# Patient Record
Sex: FEMALE | Race: WHITE | NOT HISPANIC OR LATINO | Employment: FULL TIME | URBAN - METROPOLITAN AREA
[De-identification: names, ages, dates, MRNs, and addresses within clinical notes are randomized per-mention and may not be internally consistent; named-entity substitution may affect disease eponyms.]

---

## 2020-09-22 ENCOUNTER — HOSPITAL ENCOUNTER (EMERGENCY)
Facility: HOSPITAL | Age: 45
Discharge: HOME | End: 2020-09-23
Attending: EMERGENCY MEDICINE | Admitting: EMERGENCY MEDICINE

## 2020-09-22 ENCOUNTER — APPOINTMENT (OUTPATIENT)
Dept: GENERAL RADIOLOGY | Facility: HOSPITAL | Age: 45
End: 2020-09-22

## 2020-09-22 DIAGNOSIS — R00.2 PALPITATIONS: Primary | ICD-10-CM

## 2020-09-22 LAB
ALBUMIN SERPL-MCNC: 4.9 G/DL (ref 3.5–5.2)
ALBUMIN/GLOB SERPL: 1.6 G/DL
ALP SERPL-CCNC: 110 U/L (ref 39–117)
ALT SERPL W P-5'-P-CCNC: 59 U/L (ref 1–33)
ANION GAP SERPL CALCULATED.3IONS-SCNC: 11 MMOL/L (ref 5–15)
AST SERPL-CCNC: 52 U/L (ref 1–32)
B-HCG UR QL: NEGATIVE
BASOPHILS # BLD AUTO: 0.08 10*3/MM3 (ref 0–0.2)
BASOPHILS NFR BLD AUTO: 0.7 % (ref 0–1.5)
BILIRUB SERPL-MCNC: 0.5 MG/DL (ref 0–1.2)
BUN SERPL-MCNC: 13 MG/DL (ref 6–20)
BUN/CREAT SERPL: 16.9 (ref 7–25)
CALCIUM SPEC-SCNC: 10.1 MG/DL (ref 8.6–10.5)
CHLORIDE SERPL-SCNC: 101 MMOL/L (ref 98–107)
CO2 SERPL-SCNC: 29 MMOL/L (ref 22–29)
CREAT SERPL-MCNC: 0.77 MG/DL (ref 0.57–1)
DEPRECATED RDW RBC AUTO: 47 FL (ref 37–54)
EOSINOPHIL # BLD AUTO: 0.34 10*3/MM3 (ref 0–0.4)
EOSINOPHIL NFR BLD AUTO: 2.8 % (ref 0.3–6.2)
ERYTHROCYTE [DISTWIDTH] IN BLOOD BY AUTOMATED COUNT: 14.4 % (ref 12.3–15.4)
GFR SERPL CREATININE-BSD FRML MDRD: 81 ML/MIN/1.73
GLOBULIN UR ELPH-MCNC: 3.1 GM/DL
GLUCOSE SERPL-MCNC: 132 MG/DL (ref 65–99)
HCT VFR BLD AUTO: 42.3 % (ref 34–46.6)
HGB BLD-MCNC: 13 G/DL (ref 12–15.9)
HOLD SPECIMEN: NORMAL
IMM GRANULOCYTES # BLD AUTO: 0.08 10*3/MM3 (ref 0–0.05)
IMM GRANULOCYTES NFR BLD AUTO: 0.7 % (ref 0–0.5)
LYMPHOCYTES # BLD AUTO: 3.67 10*3/MM3 (ref 0.7–3.1)
LYMPHOCYTES NFR BLD AUTO: 30.5 % (ref 19.6–45.3)
MAGNESIUM SERPL-MCNC: 2.5 MG/DL (ref 1.6–2.6)
MCH RBC QN AUTO: 27.7 PG (ref 26.6–33)
MCHC RBC AUTO-ENTMCNC: 30.7 G/DL (ref 31.5–35.7)
MCV RBC AUTO: 90.2 FL (ref 79–97)
MONOCYTES # BLD AUTO: 0.53 10*3/MM3 (ref 0.1–0.9)
MONOCYTES NFR BLD AUTO: 4.4 % (ref 5–12)
NEUTROPHILS NFR BLD AUTO: 60.9 % (ref 42.7–76)
NEUTROPHILS NFR BLD AUTO: 7.35 10*3/MM3 (ref 1.7–7)
NRBC BLD AUTO-RTO: 0 /100 WBC (ref 0–0.2)
NT-PROBNP SERPL-MCNC: 45.1 PG/ML (ref 0–450)
PLATELET # BLD AUTO: 374 10*3/MM3 (ref 140–450)
PMV BLD AUTO: 9.5 FL (ref 6–12)
POTASSIUM SERPL-SCNC: 3.9 MMOL/L (ref 3.5–5.2)
PROT SERPL-MCNC: 8 G/DL (ref 6–8.5)
RBC # BLD AUTO: 4.69 10*6/MM3 (ref 3.77–5.28)
SODIUM SERPL-SCNC: 141 MMOL/L (ref 136–145)
TROPONIN T SERPL-MCNC: <0.01 NG/ML (ref 0–0.03)
TSH SERPL DL<=0.05 MIU/L-ACNC: 3.96 UIU/ML (ref 0.27–4.2)
WBC # BLD AUTO: 12.05 10*3/MM3 (ref 3.4–10.8)
WHOLE BLOOD HOLD SPECIMEN: NORMAL
WHOLE BLOOD HOLD SPECIMEN: NORMAL

## 2020-09-22 PROCEDURE — 80053 COMPREHEN METABOLIC PANEL: CPT | Performed by: EMERGENCY MEDICINE

## 2020-09-22 PROCEDURE — 83880 ASSAY OF NATRIURETIC PEPTIDE: CPT | Performed by: EMERGENCY MEDICINE

## 2020-09-22 PROCEDURE — 93005 ELECTROCARDIOGRAM TRACING: CPT

## 2020-09-22 PROCEDURE — 99284 EMERGENCY DEPT VISIT MOD MDM: CPT

## 2020-09-22 PROCEDURE — 84484 ASSAY OF TROPONIN QUANT: CPT | Performed by: EMERGENCY MEDICINE

## 2020-09-22 PROCEDURE — 84443 ASSAY THYROID STIM HORMONE: CPT | Performed by: EMERGENCY MEDICINE

## 2020-09-22 PROCEDURE — 81025 URINE PREGNANCY TEST: CPT | Performed by: EMERGENCY MEDICINE

## 2020-09-22 PROCEDURE — 93005 ELECTROCARDIOGRAM TRACING: CPT | Performed by: EMERGENCY MEDICINE

## 2020-09-22 PROCEDURE — 83735 ASSAY OF MAGNESIUM: CPT | Performed by: EMERGENCY MEDICINE

## 2020-09-22 PROCEDURE — 85025 COMPLETE CBC W/AUTO DIFF WBC: CPT | Performed by: EMERGENCY MEDICINE

## 2020-09-22 PROCEDURE — 71045 X-RAY EXAM CHEST 1 VIEW: CPT

## 2020-09-22 RX ORDER — SODIUM CHLORIDE 0.9 % (FLUSH) 0.9 %
10 SYRINGE (ML) INJECTION AS NEEDED
Status: DISCONTINUED | OUTPATIENT
Start: 2020-09-22 | End: 2020-09-23 | Stop reason: HOSPADM

## 2020-09-23 VITALS
HEART RATE: 80 BPM | OXYGEN SATURATION: 98 % | DIASTOLIC BLOOD PRESSURE: 74 MMHG | TEMPERATURE: 94.6 F | SYSTOLIC BLOOD PRESSURE: 120 MMHG | WEIGHT: 293 LBS | RESPIRATION RATE: 18 BRPM | BODY MASS INDEX: 47.09 KG/M2 | HEIGHT: 66 IN

## 2020-09-23 NOTE — ED PROVIDER NOTES
Subjective   Ms. Kenia Oneil is a 44 y.o female presenting to the emergency department with complaints of palpitations. She was at [facility] on Friday, 4 days ago, when she had sudden tingling in her feet and hands. She sat down and felt generalized weakness, dehydration, and nausea. She contacted 911 and went to the [facility]. Her heart rate was irregular and she had premature ventricular contractions. They checked her thyroid and she was placed on thyroid medications. She is scheduled to meet with her primary care physician on 10/07/2020 but she feels she is unable to wait because tonight she had more palpitations. At first, her palpitations were racing and then it became irregular as though it were skipping beats. She confirms her palpitations are currently resolved. She has a history of hypertension. She denies chest pain, shortness of breath, and a history of smoking, alcohol use, and drug use. There are no other acute symptoms at this time.      History provided by:  Patient  Palpitations  Palpitations quality: fast, irregular.  Onset quality:  Sudden  Duration:  1 day  Timing:  Intermittent  Progression:  Resolved (currently)  Chronicity:  New  Relieved by:  None tried  Worsened by:  Nothing  Ineffective treatments:  None tried  Associated symptoms: nausea and weakness    Associated symptoms: no chest pain, no leg pain, no shortness of breath and no vomiting        Review of Systems   Constitutional: Negative for chills and fever.        Dehydration   Respiratory: Negative for shortness of breath.    Cardiovascular: Positive for palpitations. Negative for chest pain.   Gastrointestinal: Positive for nausea. Negative for vomiting.   Genitourinary: Negative for difficulty urinating and dysuria.   Neurological: Positive for weakness.        Tingling in feet and hands   All other systems reviewed and are negative.      Past Medical History:   Diagnosis Date   • Disease of thyroid gland    •  Hypertension        No Known Allergies    Past Surgical History:   Procedure Laterality Date   •  SECTION      x2; ,        Family History   Problem Relation Age of Onset   • Lung cancer Mother    • Heart disease Mother    • Coronary artery disease Mother    • Thyroid disease Mother    • Other Father         Factor 5    • Hypertension Father    • Hypertension Sister    • Cancer Maternal Grandmother    • No Known Problems Maternal Grandfather    • No Known Problems Paternal Grandmother    • No Known Problems Paternal Grandfather        Social History     Socioeconomic History   • Marital status:      Spouse name: Not on file   • Number of children: Not on file   • Years of education: Not on file   • Highest education level: Not on file   Tobacco Use   • Smoking status: Never Smoker   • Smokeless tobacco: Never Used   Substance and Sexual Activity   • Alcohol use: Never     Frequency: Never   • Drug use: Never   • Sexual activity: Defer   Social History Narrative    Caffeine: 2-3 cans soda but recently 1/2 can Sunkist         Objective   Physical Exam  Vitals signs and nursing note reviewed.   Constitutional:       General: She is not in acute distress.     Appearance: Normal appearance. She is well-developed. She is obese. She is not ill-appearing or toxic-appearing.   HENT:      Head: Normocephalic and atraumatic.      Nose: Nose normal.      Mouth/Throat:      Mouth: Mucous membranes are moist.   Eyes:      General: Lids are normal.      Extraocular Movements: Extraocular movements intact.      Conjunctiva/sclera: Conjunctivae normal.      Pupils: Pupils are equal, round, and reactive to light.   Neck:      Musculoskeletal: Full passive range of motion without pain and normal range of motion.      Trachea: Trachea normal.   Cardiovascular:      Rate and Rhythm: Regular rhythm.      Pulses: Normal pulses.      Heart sounds: Normal heart sounds.   Pulmonary:      Effort: Pulmonary effort is  normal. No respiratory distress.      Breath sounds: Normal breath sounds. No decreased breath sounds, wheezing, rhonchi or rales.   Abdominal:      General: Bowel sounds are normal.      Palpations: Abdomen is soft.      Tenderness: There is no abdominal tenderness.   Musculoskeletal: Normal range of motion.   Skin:     General: Skin is warm and dry.      Findings: No rash.   Neurological:      Mental Status: She is alert and oriented to person, place, and time.      Cranial Nerves: No cranial nerve deficit.   Psychiatric:         Speech: Speech normal.         Behavior: Behavior normal. Behavior is cooperative.         Procedures         ED Course  ED Course as of Sep 27 0318   Tue Sep 22, 2020   2335   COVID-19 RISK SCREEN    Has the patient had close contact without PPE with a lab confirmed COVID-19 (+) person or a person under investigation (PUI) for COVID-19 infection?  -- No     Has the patient had respiratory symptoms, worsened/new cough and/or SOA, unexplained fever, or sudden loss of smell and/or taste in the past 7 days? --  No    Does the patient have baseline higher exposure risk such as working in healthcare field or currently residing in healthcare facility?  --  No          [HV]   Wed Sep 23, 2020   0001 Results are discussed with patient at this time.  Patient is to follow-up with her outpatient cardiac event monitor clinic.  Patient is to return to the ED as needed.  Patient agrees with treatment plan and verbalized understanding.    [KG]      ED Course User Index  [HV] Trudy John  [KG] Eloise Minaya, APRN     No results found for this or any previous visit (from the past 24 hour(s)).  Note: In addition to lab results from this visit, the labs listed above may include labs taken at another facility or during a different encounter within the last 24 hours. Please correlate lab times with ED admission and discharge times for further clarification of the services performed during this  visit.    XR Chest 1 View   Final Result   No acute cardiopulmonary findings.      Signer Name: Xiang Garvin MD    Signed: 9/22/2020 10:21 PM    Workstation Name: [email or web address]     [facility]        Vitals:    09/22/20 2230 09/22/20 2302 09/22/20 2330 09/23/20 0030   BP: 121/79 132/65 125/84 120/74   Pulse: 75 82 75 80   Resp:       Temp:       SpO2: 98% 98% 98% 98%   Weight:       Height:         Medications - No data to display  ECG/EMG Results (last 24 hours)     Procedure Component Value Units Date/Time    ECG 12 Lead [[id number]] Collected: 09/22/20 2054     Updated: 09/22/20 2056        ECG 12 Lead   Final Result   Test Reason : Dysrhythmia triage protocol   Blood Pressure : **/** mmHG   Vent. Rate : 082 BPM     Atrial Rate : 082 BPM      P-R Int : 176 ms          QRS Dur : 098 ms       QT Int : 418 ms       P-R-T Axes : 041 -44 074 degrees      QTc Int : 488 ms      Normal sinus rhythm   Left axis deviation   Nonspecific T wave abnormality   Abnormal ECG   No previous ECGs available   Confirmed by MONTY LYNCH ([id number]) on 9/26/2020 7:37:13 AM      Referred By:  EDMD           Confirmed By:MONTY LYNCH                                                 [facility]    Final diagnoses:   Palpitations       Documentation assistance provided by rocio Jonh.  Information recorded by the scribsigifredo was done at my direction and has been verified and validated by me.     Turdy John  09/22/20 5948       Eloise Minaya APRN  09/27/20 0318

## 2020-09-24 ENCOUNTER — HOSPITAL ENCOUNTER (OUTPATIENT)
Dept: CARDIOLOGY | Facility: HOSPITAL | Age: 45
Discharge: HOME | End: 2020-09-24

## 2020-09-24 ENCOUNTER — OFFICE VISIT (OUTPATIENT)
Dept: CARDIOLOGY | Facility: HOSPITAL | Age: 45
End: 2020-09-24

## 2020-09-24 VITALS
SYSTOLIC BLOOD PRESSURE: 139 MMHG | DIASTOLIC BLOOD PRESSURE: 73 MMHG | HEART RATE: 89 BPM | BODY MASS INDEX: 47.09 KG/M2 | TEMPERATURE: 97.7 F | WEIGHT: 293 LBS | OXYGEN SATURATION: 99 % | RESPIRATION RATE: 20 BRPM | HEIGHT: 66 IN

## 2020-09-24 DIAGNOSIS — E03.9 HYPOTHYROIDISM (ACQUIRED): ICD-10-CM

## 2020-09-24 DIAGNOSIS — I49.3 PVC'S (PREMATURE VENTRICULAR CONTRACTIONS): Primary | ICD-10-CM

## 2020-09-24 PROCEDURE — 99204 OFFICE O/P NEW MOD 45 MIN: CPT | Performed by: NURSE PRACTITIONER

## 2020-09-24 PROCEDURE — 93225 XTRNL ECG REC<48 HRS REC: CPT

## 2020-09-24 RX ORDER — LEVOTHYROXINE SODIUM 0.1 MG/1
100 TABLET ORAL DAILY
COMMUNITY
Start: 2020-09-19

## 2020-09-24 RX ORDER — ROSUVASTATIN CALCIUM 20 MG/1
20 TABLET, COATED ORAL EVERY OTHER DAY
COMMUNITY

## 2020-09-24 RX ORDER — AMLODIPINE AND VALSARTAN 5; 160 MG/1; MG/1
1 TABLET ORAL DAILY
COMMUNITY
Start: 2020-07-07

## 2020-09-24 RX ORDER — FERROUS SULFATE 137(45) MG
1 TABLET, EXTENDED RELEASE ORAL DAILY
COMMUNITY

## 2020-09-24 RX ORDER — CETIRIZINE HYDROCHLORIDE 10 MG/1
10 TABLET ORAL DAILY
COMMUNITY

## 2020-09-24 NOTE — PROGRESS NOTES
[facility]      2020      Kenia Oneil  [address] [address]  [unfilled]    1975    Annmarie Lopez, [place]    Kenia Oneil is a 44 y.o. female.      Subjective:     Chief Complaint:  Palpitations and Establish Care       HPI 44-year-old female presents to the office today for ongoing evaluation of her palpitations.  She reports symptoms started last Friday while she was shopping at [employer].  Her hands and feet became numb and she then began to experience dizziness and lightheadedness.  She also noted diaphoresis as well as generalized weakness.  She sat down on the floor and her son called EMS.  She was subsequently taken to [facility] and was diagnosed with PVCs.  She noted that her TSH was high and reports a reading of 7.89.  She was started on Synthroid and discharged home.  She reports she presented to [facility] again last last night after experiencing palpitations and a fluttering sensation in her heart.  She reports that the palpitations are occurring daily and she has associated symptoms of dizziness and dyspnea.  Notes the blood pressure has been well controlled 120s to 130s systolic.      There is no problem list on file for this patient.      Past Medical History:   Diagnosis Date   • Disease of thyroid gland    • Hypertension        Past Surgical History:   Procedure Laterality Date   •  SECTION      x2; ,        Family History   Problem Relation Age of Onset   • Lung cancer Mother    • Heart disease Mother    • Coronary artery disease Mother    • Thyroid disease Mother    • Other Father         Factor 5    • Hypertension Father    • Hypertension Sister    • Cancer Maternal Grandmother    • No Known Problems Maternal Grandfather    • No Known Problems Paternal Grandmother    • No Known Problems Paternal Grandfather        Social History     Socioeconomic History   • Marital status:       Spouse name: Not on file   • Number of children: Not on file   • Years of education: Not on file   • Highest education level: Not on file   Tobacco Use   • Smoking status: Never Smoker   • Smokeless tobacco: Never Used   Substance and Sexual Activity   • Alcohol use: Never     Frequency: Never   • Drug use: Never   • Sexual activity: Defer   Social History Narrative    Caffeine: 2-3 cans soda but recently 1/2 can Sunkist       No Known Allergies      Current Outpatient Medications:   •  amLODIPine-valsartan (EXFORGE) 5-160 MG per tablet, Take 1 tablet by mouth Daily., Disp: , Rfl:   •  cetirizine (ZyrTEC Allergy) 10 MG tablet, Take 10 mg by mouth Daily., Disp: , Rfl:   •  Ferrous Sulfate ER (Slow Fe) 142 (45 Fe) MG tablet controlled-release, Take 1 tablet by mouth Daily., Disp: , Rfl:   •  levothyroxine (SYNTHROID, LEVOTHROID) 100 MCG tablet, Take 100 mcg by mouth Daily., Disp: , Rfl:   •  rosuvastatin (Crestor) 20 MG tablet, Take 20 mg by mouth Every Other Day., Disp: , Rfl:     The following portions of the patient's history were reviewed today and updated as appropriate: allergies, current medications, past family history, past medical history, past social history, past surgical history and problem list     Review of Systems   Constitution: Positive for malaise/fatigue. Negative for chills, decreased appetite, diaphoresis, fever, night sweats, weight gain and weight loss.   HENT: Negative for congestion, hearing loss, hoarse voice and nosebleeds.    Eyes: Negative for blurred vision, visual disturbance and visual halos.   Cardiovascular: Positive for dyspnea on exertion and palpitations. Negative for chest pain, claudication, cyanosis, irregular heartbeat, leg swelling, near-syncope, orthopnea, paroxysmal nocturnal dyspnea and syncope.   Respiratory: Negative for cough, hemoptysis, shortness of breath, sleep disturbances due to breathing, snoring, sputum production and wheezing.   "  Hematologic/Lymphatic: Negative for bleeding problem. Does not bruise/bleed easily.   Skin: Negative for dry skin, itching and rash.   Musculoskeletal: Negative for arthritis, falls, joint pain, joint swelling and myalgias.   Gastrointestinal: Negative for bloating, abdominal pain, constipation, diarrhea, flatus, heartburn, hematemesis, hematochezia, melena, nausea and vomiting.   Genitourinary: Negative for dysuria, frequency, hematuria, nocturia and urgency.   Neurological: Positive for dizziness. Negative for excessive daytime sleepiness, headaches, light-headedness, loss of balance and weakness.   Psychiatric/Behavioral: Negative for depression. The patient does not have insomnia and is not nervous/anxious.        Objective:     Vitals:    09/24/20 0747 09/24/20 0748 09/24/20 0749   BP: 131/60 124/63 139/73   BP Location: Left arm Left arm Right arm   Patient Position: Standing Sitting Sitting   Cuff Size: Large Adult Large Adult Large Adult   Pulse: 96 92 89   Resp:   20   Temp:   97.7 °F (36.5 °C)   TempSrc:   Temporal   SpO2: 97% 95% 99%   Weight:   (!) 138 kg (305 lb)   Height:   167.6 cm (66\")     Body mass index is 49.23 kg/m².  Vitals signs and nursing note reviewed.   Constitutional:       General: Not in acute distress.     Appearance: Well-developed.   Eyes:      Conjunctiva/sclera: Conjunctivae normal.      Pupils: Pupils are equal, round, and reactive to light.   HENT:      Head: Normocephalic and atraumatic.   Neck:      Musculoskeletal: Normal range of motion and neck supple.      Thyroid: No thyromegaly.      Vascular: No JVD.      Trachea: No tracheal deviation.   Pulmonary:      Effort: Pulmonary effort is normal.      Breath sounds: Normal breath sounds.   Cardiovascular:      PMI at left midclavicular line. Normal rate. Regular rhythm. Normal S1. Normal S2.      Murmurs: There is no murmur.      No gallop. No click. No rub.   Pulses:     Intact distal pulses.   Edema:     Peripheral edema " absent.   Abdominal:      General: Bowel sounds are normal. There is no distension.      Palpations: Abdomen is soft.      Tenderness: There is no abdominal tenderness.   Musculoskeletal: Normal range of motion.   Skin:     General: Skin is warm and dry.   Neurological:      Mental Status: Alert and oriented to person, place, and time.   Psychiatric:         Behavior: Behavior normal. Behavior is cooperative.         Lab and Diagnostic Review:  Results for orders placed or performed during the hospital encounter of 09/22/20   Comprehensive Metabolic Panel    Specimen: Blood   Result Value Ref Range    Glucose 132 (H) 65 - 99 mg/dL    BUN 13 6 - 20 mg/dL    Creatinine 0.77 0.57 - 1.00 mg/dL    Sodium 141 136 - 145 mmol/L    Potassium 3.9 3.5 - 5.2 mmol/L    Chloride 101 98 - 107 mmol/L    CO2 29.0 22.0 - 29.0 mmol/L    Calcium 10.1 8.6 - 10.5 mg/dL    Total Protein 8.0 6.0 - 8.5 g/dL    Albumin 4.90 3.50 - 5.20 g/dL    ALT (SGPT) 59 (H) 1 - 33 U/L    AST (SGOT) 52 (H) 1 - 32 U/L    Alkaline Phosphatase 110 39 - 117 U/L    Total Bilirubin 0.5 0.0 - 1.2 mg/dL    eGFR Non African Amer 81 >60 mL/min/1.73    Globulin 3.1 gm/dL    A/G Ratio 1.6 g/dL    BUN/Creatinine Ratio 16.9 7.0 - 25.0    Anion Gap 11.0 5.0 - 15.0 mmol/L   Magnesium    Specimen: Blood   Result Value Ref Range    Magnesium 2.5 1.6 - 2.6 mg/dL   Troponin    Specimen: Blood   Result Value Ref Range    Troponin T <0.010 0.000 - 0.030 ng/mL   TSH    Specimen: Blood   Result Value Ref Range    TSH 3.960 0.270 - 4.200 uIU/mL   BNP    Specimen: Blood   Result Value Ref Range    proBNP 45.1 0.0 - 450.0 pg/mL   Pregnancy, Urine - Urine, Clean Catch    Specimen: Urine, Clean Catch   Result Value Ref Range    HCG, Urine QL Negative Negative   CBC Auto Differential    Specimen: Blood   Result Value Ref Range    WBC 12.05 (H) 3.40 - 10.80 10*3/mm3    RBC 4.69 3.77 - 5.28 10*6/mm3    Hemoglobin 13.0 12.0 - 15.9 g/dL    Hematocrit 42.3 34.0 - 46.6 %    MCV 90.2 79.0 -  97.0 fL    MCH 27.7 26.6 - 33.0 pg    MCHC 30.7 (L) 31.5 - 35.7 g/dL    RDW 14.4 12.3 - 15.4 %    RDW-SD 47.0 37.0 - 54.0 fl    MPV 9.5 6.0 - 12.0 fL    Platelets 374 140 - 450 10*3/mm3    Neutrophil % 60.9 42.7 - 76.0 %    Lymphocyte % 30.5 19.6 - 45.3 %    Monocyte % 4.4 (L) 5.0 - 12.0 %    Eosinophil % 2.8 0.3 - 6.2 %    Basophil % 0.7 0.0 - 1.5 %    Immature Grans % 0.7 (H) 0.0 - 0.5 %    Neutrophils, Absolute 7.35 (H) 1.70 - 7.00 10*3/mm3    Lymphocytes, Absolute 3.67 (H) 0.70 - 3.10 10*3/mm3    Monocytes, Absolute 0.53 0.10 - 0.90 10*3/mm3    Eosinophils, Absolute 0.34 0.00 - 0.40 10*3/mm3    Basophils, Absolute 0.08 0.00 - 0.20 10*3/mm3    Immature Grans, Absolute 0.08 (H) 0.00 - 0.05 10*3/mm3    nRBC 0.0 0.0 - 0.2 /100 WBC   Light Blue Top   Result Value Ref Range    Extra Tube hold for add-on    Green Top (Gel)   Result Value Ref Range    Extra Tube Hold for add-ons.    Lavender Top   Result Value Ref Range    Extra Tube hold for add-on      EKG: Normal sinus rhythm  Left axis deviation  Nonspecific T wave abnormality  Abnormal ECG  No previous ECGs available    Assessment and Plan:   1. PVC's (premature ventricular contractions)  Discussed avoidance of caffeine, alcohol, otc decongestants  - Holter Monitor - 48 Hour    2. Hypothyroidism (acquired)  Continue synthroid and keep follow up with PCP for recheck of labs           It has been a pleasure to participate in the care of this patient.  Patient was instructed to call the [facility] with any questions, concerns, or worsening symptoms.    *Please note that portions of this note were completed with a voice recognition program. Efforts were made to edit the dictations, but occasionally words are mistranscribed.    [facility] Heart Monitor Documentation    Kenia Oneil  1975  [id number]  09/24/20    MARINA Peña    [] ZIO XT Patch  Model [id number] Prescribed for  Days    · Serial Number: (N + 9 Digits) N   · Apply-By Date on Box:    · USPS Tracking Number:   · USPS Tracking        [] [employer] MINI PLUS Mobile Cardiac Telemetry  Model BGMINIPLUS Prescribed for N/A Days    · Serial Number: (BGM + 7 Digits) BGM  · Shipped-By Date on Box:   · UPS Tracking Number: [id number]  · UPS Tracking      [x] [employer] MINI Holter Monitor  Model BGMINIEL Prescribed for 2 Days    · Serial Number: (7 Digits) [id number]  · Shipped-By Date on Box: 09/22/2020  · UPS Tracking Number: [id number]  · UPS Tracking        This monitor was applied to the patient's chest and checked for proper functioning.  Ms. Kenia Oneil was instructed in the proper use of this monitor.  She was given the opportunity to ask questions and left the office with the device 's instruction manual.    Nona Lorenzo, MARINA, 08:40 EDT, 09/24/20                  [facility] CenterMONITORDOCUMENTATION 8.8.2019

## 2020-10-09 ENCOUNTER — TELEPHONE (OUTPATIENT)
Dept: CARDIOLOGY | Facility: HOSPITAL | Age: 45
End: 2020-10-09

## 2020-10-09 PROCEDURE — 93227 XTRNL ECG REC<48 HR R&I: CPT | Performed by: INTERNAL MEDICINE

## 2020-10-09 NOTE — TELEPHONE ENCOUNTER
Reviewed 48 hour holter with patient.Pac/ pvc burden less than <1%. She notes that palpitations have improved greatly.